# Patient Record
(demographics unavailable — no encounter records)

---

## 2024-10-31 NOTE — PHYSICAL EXAM
[Well Developed] : well developed [NAD] : in no acute distress [PERRL] : pupils were equal, round, reactive to light  [icteric] : anicteric [Moist & Pink Mucous Membranes] : moist and pink mucous membranes [CTAB] : lungs clear to auscultation bilaterally [Respiratory Distress] : no respiratory distress  [Regular Rate and Rhythm] : regular rate and rhythm [Normal S1, S2] : normal S1 and S2 [Soft] : soft  [Distended] : non distended [Tender] : tender  [RLQ] : in the right lower quadrant [Epigastric] : in the epigastric area [LLQ] : in the left lower quadrant [Normal Bowel Sounds] : normal bowel sounds [No HSM] : no hepatosplenomegaly appreciated [Normal Tone] : normal tone [Well-Perfused] : well-perfused [Edema] : no edema [Cyanosis] : no cyanosis [Rash] : no rash [Jaundice] : no jaundice [Interactive] : interactive

## 2024-10-31 NOTE — PAST MEDICAL HISTORY
[At Term] : at term [Normal Vaginal Route] : by normal vaginal route [] : There were no problems passing meconium within 24 - 48 hrs of life [FreeTextEntry1] : 7lbs 3oz

## 2024-10-31 NOTE — CONSULT LETTER
[Dear  ___] : Dear  [unfilled], [Consult Letter:] : I had the pleasure of evaluating your patient, [unfilled]. [Please see my note below.] : Please see my note below. [Consult Closing:] : Thank you very much for allowing me to participate in the care of this patient.  If you have any questions, please do not hesitate to contact me. [Sincerely,] : Sincerely, [FreeTextEntry3] : Liseth Astudillo MD [facility] physician partners Pediatric gastroenterologist , [facility] of medicine at [facility] [phone] [email or web address]

## 2024-10-31 NOTE — HISTORY OF PRESENT ILLNESS
[de-identified] : Stomach pain  SARBJIT BENITEZ , is  a 6 year old female with no PMH here for initial consultation for  abd pain.  Had abdominal pain, vomiting and diarrhea on October 5.  Vomiting and diarrhea stopped after 2 days. However continues to have abdominal pain that is daily and constant.  Pain is in the epigastric right upper quadrant and left upper quadrant regions.  No association with eating.  No improvement with flatus or bowel movements.  Usually just improves with time pain  is epigastric, RUQ and LUQ pain.   pain is daily.  is constant mom is using probiotics and some carbonated water.  Denies fever, heartburn, chest pain, continue nausea or vomiting or weight loss.  Eating has remained normal  stools are type I or IV daily. does strain at times. no blood or mucus noted.  Previously was having soft stools with no straining. likes yogurt, milk. eats varied diet      Denies abdominal pain, nausea, vomiting, constipation, diarrhea, easy bleeding or bruising, jaundice, hematochezia, melena, recurrent fevers or infection, mouth sores, joint swelling, vision changes, unintentional weight loss.   Denies choking, dysphagia, cyanosis with feeds.

## 2024-10-31 NOTE — ASSESSMENT
[Educated Patient & Family about Diagnosis] : educated the patient and family about the diagnosis [FreeTextEntry1] : SARBJIT  is a 6 year  old  here for consultation for epigastric/right upper quadrant/left upper quadrant abdominal pain for the past several weeks after having abdominal pain vomiting and diarrhea several weeks ago.  Vomiting and diarrhea have stopped and now has some harder stools. Exam notes tenderness palpation in the epigastric and right lower quadrant and left lower quadrant regions  Differential diagnosis  includes postinfectious abdominal pain versus  inflammation, constipation, GERD, autoimmune disorder, pancreatitis, hepatitis, IBS or functional abdominal pain           Recommendations Labs: as below medications:  IB guard 2 capsules up to 3 times a day, Iberogast up to 3 times a day.  Would try lactose-free diet along with 2 fiber Gummies daily.  If no improvement with all of the above can use famotidine 1.2 mL twice a day abdominal breathing Lactose-free and high-fiber diet   Follow up in 4 to 6 weeks

## 2025-07-15 NOTE — END OF VISIT
[FreeTextEntry3] :     Saw and examined patient; the above is an accurate documentation of my words and actions.   Amalia Vazquez MD [facility] Pediatric Orthopedic Surgery

## 2025-07-15 NOTE — DATA REVIEWED
[de-identified] : Scoliosis x-rays AP and lateral were done today.  There is 10 degrees from T6-L3 on AP x-ray. The disc heights are maintained.  Sagittal alignment is maintained.  Coronal balance is maintained.  There no vertebral abnormalities that were noticed.Risser zero

## 2025-07-15 NOTE — DATA REVIEWED
[de-identified] : Scoliosis x-rays AP and lateral were done today.  There is 10 degrees from T6-L3 on AP x-ray. The disc heights are maintained.  Sagittal alignment is maintained.  Coronal balance is maintained.  There no vertebral abnormalities that were noticed.Risser zero

## 2025-07-15 NOTE — END OF VISIT
[FreeTextEntry3] :     Saw and examined patient; the above is an accurate documentation of my words and actions.   Amalia Vazquze MD [facility] Pediatric Orthopedic Surgery

## 2025-07-15 NOTE — HISTORY OF PRESENT ILLNESS
[FreeTextEntry1] : Tika is a 6Y female who presents with her mother for follow up of spinal asymmetry. Last seen July 2024. She states that recently on routine exam with pediatrician, an asymmetry of the spine was noted. She is otherwise in her usual state of health and denies any current back pain, radiating pain or any numbness or weakness. She has no bowel or bladder dysfunction. She is able to participate in all of her normal activities. There is no family history for scoliosis. She is premenarchal currently. Mother also reports that the child has been c/o bilateral lower leg pain for past 6 months. The pain is intermittent and occurs at night time. Denies any night fever, chills or any weight loss. Here for orthopedic evaluation and management.   The patient's HPI was reviewed thoroughly with patient and parent. The patient's parent has acted as an independent historian regarding the above information due to the unreliable nature of the history obtained from the patient.

## 2025-07-15 NOTE — ASSESSMENT
[FreeTextEntry1] : Tika is a 6Y female with mild scoliosis and growing pains Today's visit included obtaining history from the parent due to the child's age, the child could not be considered a reliable historian, requiring parent to act as independent historian  Clinical imaging and exam were reviewed with patient and mother at length. Scoliosis x-rays AP and lateral done today show 10 degrees thoracic curvature. Patient is Risser 0. There is normal kyphosis and lordosis appreciated on lateral films. Natural history of scoliosis discussed in detail with patient and mother. Discussed that since patient has a significant amount of growth of the spine left, it is possible for the curve to progress further. For now, we will continue to monitor the patient's curve for the next few years.  No activity limitations. All questions answered, patient and mother understands and agrees to plan of care. Follow up in 6 months for repeat PA and lateral x-rays and reevaluation.  In regard to bilateral lower leg pain, she has growing pains as well as flat feet. This type of pain typically occurs at night and is relieved with massage, occurs in his age group and does not limit activity participation throughout the day. I am recommending observation only at this time. In regard to flat feet, I am recommending supportive sneakers with medial arch support.   All questions answered. Family and patient verbalize understanding of the plan.   I, Sarah Lopez PA-C have acted as scribe and documented the above for Dr. Vazquez

## 2025-07-15 NOTE — PHYSICAL EXAM
[FreeTextEntry1] :  General: Patient is awake and alert and in no acute distress. oriented to person, place, and time. well developed, well nourished, cooperative.   Skin: The skin is intact, warm, pink, and dry over the area examined.   Eyes: normal conjunctiva, normal eyelids and pupils were equal and round.   ENT: normal ears, normal nose and normal lips.  Cardiovascular: There is brisk capillary refill in the digits of the affected extremity. They are symmetric pulses in the bilateral upper and lower extremities, positive peripheral pulses, brisk capillary refill, but no peripheral edema.  Respiratory: The patient is in no apparent respiratory distress. They're taking full deep breaths without use of accessory muscles or evidence of audible wheezes or stridor without the use of a stethoscope, normal respiratory effort.   Musculoskeletal:.Examination of both the upper and lower extremities did not show any obvious abnormality. There is no gross deformity. Patient has full range of motion of both the hips, knees, ankles, wrists, elbows, and shoulders. Neck range of motion is full and free without any pain or spasm.   Examination of the back reveals shoulder asymmetry. The pelvis is asymmetric.  On forward bending Mild right thoracic prominence noted. Patient is able to bend forward and touch the toes as well bend backwards without pain. Lateral flexion is symmetrical and is pain free. Straight leg raising test is free to more than 70 degrees.   Neurological examination reveals a grade 5/5 muscle power. Sensation is intact to crude touch and pinprick. Deep tendon reflexes are 1+ with ankle jerk and knee jerk. The plantars are bilaterally down going. Superficial abdominal reflexes are symmetric and intact. The biceps and triceps reflexes are 1+.    There is no hairy patch, lipoma, sinus in the back. There is no pes cavus, asymmetry of calves, significant leg length discrepancy or significant cafe-au-lait spots.